# Patient Record
Sex: MALE | Race: WHITE | NOT HISPANIC OR LATINO | Employment: FULL TIME | URBAN - NONMETROPOLITAN AREA
[De-identification: names, ages, dates, MRNs, and addresses within clinical notes are randomized per-mention and may not be internally consistent; named-entity substitution may affect disease eponyms.]

---

## 2021-03-25 ENCOUNTER — IMMUNIZATION (OUTPATIENT)
Dept: VACCINE CLINIC | Facility: HOSPITAL | Age: 26
End: 2021-03-25

## 2021-03-25 PROCEDURE — 0001A: CPT | Performed by: INTERNAL MEDICINE

## 2021-03-25 PROCEDURE — 91300 HC SARSCOV02 VAC 30MCG/0.3ML IM: CPT | Performed by: INTERNAL MEDICINE

## 2021-04-15 ENCOUNTER — IMMUNIZATION (OUTPATIENT)
Dept: VACCINE CLINIC | Facility: HOSPITAL | Age: 26
End: 2021-04-15

## 2021-04-15 PROCEDURE — 0002A: CPT | Performed by: INTERNAL MEDICINE

## 2021-04-15 PROCEDURE — 91300 HC SARSCOV02 VAC 30MCG/0.3ML IM: CPT | Performed by: INTERNAL MEDICINE

## 2022-02-08 ENCOUNTER — OFFICE VISIT (OUTPATIENT)
Dept: UROLOGY | Facility: CLINIC | Age: 27
End: 2022-02-08

## 2022-02-08 VITALS — OXYGEN SATURATION: 97 % | WEIGHT: 243 LBS | BODY MASS INDEX: 34.79 KG/M2 | HEART RATE: 68 BPM | HEIGHT: 70 IN

## 2022-02-08 DIAGNOSIS — Z30.09 BIRTH CONTROL COUNSELING: Primary | ICD-10-CM

## 2022-02-08 PROCEDURE — 99204 OFFICE O/P NEW MOD 45 MIN: CPT | Performed by: UROLOGY

## 2022-02-08 RX ORDER — TRIAMCINOLONE ACETONIDE 0.25 MG/G
OINTMENT TOPICAL
COMMUNITY
Start: 2022-01-20

## 2022-02-08 RX ORDER — FLUOCINONIDE TOPICAL SOLUTION USP, 0.05% 0.5 MG/ML
0.05 SOLUTION TOPICAL NIGHTLY
COMMUNITY
Start: 2022-01-19

## 2022-02-08 RX ORDER — TACROLIMUS 1 MG/G
0.1 OINTMENT TOPICAL AS NEEDED
COMMUNITY
Start: 2022-01-19

## 2022-02-08 NOTE — PROGRESS NOTES
Office Visit New Urology      Patient Name: Barak Cuellar  : 1995   MRN: [id number]     Chief Complaint:    Chief Complaint   Patient presents with   • Sterilization       Referring Provider: Referring, Self    History of Present Illness: Barak Cuellar is a 26 y.o. male who presents to Urology today for ***      Subjective      Review of System: Review of Systems   Constitutional: Negative for chills, fatigue, fever and unexpected weight change.   HENT: Negative for sore throat.    Eyes: Negative for visual disturbance.   Respiratory: Negative for cough, chest tightness and shortness of breath.    Cardiovascular: Negative for chest pain and leg swelling.   Gastrointestinal: Negative for blood in stool, constipation, diarrhea, nausea, rectal pain and vomiting.   Genitourinary: Negative for decreased urine volume, difficulty urinating, dysuria, enuresis, flank pain, frequency, genital sores, hematuria and urgency.   Musculoskeletal: Negative for back pain and joint swelling.   Skin: Negative for rash and wound.   Neurological: Negative for seizures, speech difficulty, weakness and headaches.   Psychiatric/Behavioral: Negative for confusion, sleep disturbance and suicidal ideas. The patient is not nervous/anxious.       I have reviewed the ROS documented by my clinical staff, updated appropriately and I agree. Radha Chew MA    Past Medical History: History reviewed. No pertinent past medical history.    Past Surgical History:   Past Surgical History:   Procedure Laterality Date   • WISDOM TOOTH EXTRACTION         Family History:   Family History   Problem Relation Age of Onset   • No Known Problems Father    • No Known Problems Mother    • Prostate cancer Paternal Grandfather        Social History:   Social History     Socioeconomic History   • Marital status:    Tobacco Use   • Smoking status: Never Smoker   • Smokeless tobacco: Former User     Types: Chew   Vaping Use   • Vaping  Use: Never used   Substance and Sexual Activity   • Alcohol use: Yes   • Drug use: Never   • Sexual activity: Yes       Medications:     Current Outpatient Medications:   •  fluocinonide (LIDEX) 0.05 % external solution, Apply 0.05 application topically to the appropriate area as directed Every Night., Disp: , Rfl:   •  tacrolimus (PROTOPIC) 0.1 % ointment, Apply 0.1 application topically to the appropriate area as directed As Needed., Disp: , Rfl:   •  triamcinolone (KENALOG) 0.025 % ointment, APPLY OINTMENT TOPICALLY TWICE DAILY TO GROIN FOR 2 WEEKS, THEN ONCE DAILY FOR 2 WEEKS, THEN TWICE WEEKLY TO MAINTAIN, Disp: , Rfl:     Allergies:   No Known Allergies    IPSS Questionnaire (AUA-7):  Over the past month…    1)  Incomplete Emptying  How often have you had a sensation of not emptying your bladder?  {Ratin}   2)  Frequency  How often have you had to urinate less than every two hours? {Ratin}   3)  Intermittency  How often have you found you stopped and started again several times when you urinated?  {Ratin}   4) Urgency  How often have you found it difficult to postpone urination?  {Ratin}   5) Weak Stream  How often have you had a weak urinary stream?  {Ratin}   6) Straining  How often have you had to push or strain to begin urination?  {Ratin}   7) Nocturia  How many times did you typically get up at night to urinate?  {Ratin}   Total Score:  {0-35:21522}       Quality of life due to urinary symptoms:  If you were to spend the rest of your life with your urinary condition the way it is now, how would you feel about that? {Ratin}   Urine Leakage (Incontinence) {Ratin}     Sexual Health Inventory for Men (DEMETRI)   Over the past 6 months:     1. How do you rate your confidence that you could get and keep an erection?  {DEMETRI Score 1:06224}   2. When you had erections with sexual                                     stimulation, how often were your  "erections hard enough for penetration (entering your partner)?  {DEMETRI Scores 2:30643}   3. During sexual intercourse, how often were you able to maintain your erection after you had penetrated (entered) your partner?  {DEMETRI Scores 3:76810}   4. During sexual intercourse, how difficult was it to maintain your erection to completion of intercourse?  {DEMETRI Scores 4:53253}   5. When you attempted sexual intercourse, how often was it satisfactory for you?  {DEMETRI Scores 5:58398}    Total Score: {DEMETRI Total:08808}   The Sexual Health Inventory for Men further classifies ED severity with the following breakpoints:   1-7 (Severe ED) 8-11 (Moderate ED) 12-16 (Mild to Moderate ED) 17-21 (Mild ED)      Post void residual bladder scan:   ***       Objective     Physical Exam:   Vital Signs:   Vitals:    02/08/22 0811   Pulse: 68   SpO2: 97%   Weight: 110 kg (243 lb)   Height: 177.8 cm (70\")   PainSc: 0-No pain     Body mass index is 34.87 kg/m².     Physical Exam    Genitourinary  Penis: {Desc; circumcised/uncircumcised:5705::\"circumcised\"} penis, glans normal, no penile discharge.  No rashes/lesions.    Testes: descended bilaterally, no masses, {Desc; tender/non:61388} to palpation. Remainder of scrotal contents normal. No hernia appreciated.  Rectal:  Normal tone, no masses.  Prostate:  {NUMBERS; 5-50 BY 5:14916} grams.  Symmetric, non-tender, anodular and no induration.      Labs:   Brief Urine Lab Results     None               No results found for: GLUCOSE, CALCIUM, NA, K, CO2, CL, BUN, CREATININE, EGFRIFAFRI, EGFRIFNONA, BCR, ANIONGAP    No results found for: WBC, HGB, HCT, MCV, PLT    Images:   No Images in the past 120 days found..    Measures:   Tobacco:   Barak Cuellar  reports that he has never smoked. He has quit using smokeless tobacco.  His smokeless tobacco use included chew.. I have educated him on the risk of diseases from using tobacco products such as {Tobacco Cessation " "Diseases:72943::\"cancer\",\"COPD\",\"heart disease\"}.     I advised him to quit and he is {Willing/Not Willing to Quit Tobacco Products:10527}.    I spent {Time Spent Tobacco :43845} minutes counseling the patient.           Urine Incontinence: ( NOUI)  ***   Assessment / Plan      Assessment/Plan:   26 y.o. male is here today for ***    There are no diagnoses linked to this encounter.       Follow Up:   Return in about 6 weeks (around 3/24/2022).    I spent approximately *** minutes providing clinical care for this patient; including review of patient's chart and provider documentation, face to face time spent with patient in examination room (obtaining history, performing physical exam, discussing diagnosis and management options), placing orders, and completing patient documentation.     Herberth Carmona MD  [facility] [place]   "

## 2022-02-08 NOTE — PROGRESS NOTES
Office Note Vasectomy Consult     Patient Name: Barak Cuellar  : 1995   MRN: [id number]     Chief Complaint:  Elective Sterilization.   Chief Complaint   Patient presents with   • Sterilization       Referring Provider: Referring, Self    History of Present Illness: Barak Cuellar is a 26 y.o. male who presents to Urology today with the desire for irreversible, elective sterilization. He has 2 biological children.  His and his wife have discussed this decision and she accompanies him at appointment today. He denies lower urinary tract symptoms. Denies erectile dysfunction. Denies prior urologic evaluation including instrumentation or procedure.      Subjective      Review of Systems: Review of Systems   Constitutional: Negative for chills, fatigue, fever and unexpected weight change.   HENT: Negative for sore throat.    Eyes: Negative for visual disturbance.   Respiratory: Negative for cough, chest tightness and shortness of breath.    Cardiovascular: Negative for chest pain and leg swelling.   Gastrointestinal: Negative for blood in stool, constipation, diarrhea, nausea, rectal pain and vomiting.   Genitourinary: Negative for decreased urine volume, difficulty urinating, dysuria, enuresis, flank pain, frequency, genital sores, hematuria and urgency.   Musculoskeletal: Negative for back pain and joint swelling.   Skin: Negative for rash and wound.   Neurological: Negative for seizures, speech difficulty, weakness and headaches.   Psychiatric/Behavioral: Negative for confusion, sleep disturbance and suicidal ideas. The patient is not nervous/anxious.       I have reviewed the ROS documented by my clinical staff, I have updated appropriately and I agree. Herberth Carmona MD    Past Medical History: History reviewed. No pertinent past medical history.    Past Surgical History:   Past Surgical History:   Procedure Laterality Date   • WISDOM TOOTH EXTRACTION         Family History:   Family History  "  Problem Relation Age of Onset   • No Known Problems Father    • No Known Problems Mother    • Prostate cancer Paternal Grandfather        Social History:   Social History     Socioeconomic History   • Marital status:    Tobacco Use   • Smoking status: Never Smoker   • Smokeless tobacco: Former User     Types: Chew   Vaping Use   • Vaping Use: Never used   Substance and Sexual Activity   • Alcohol use: Yes   • Drug use: Never   • Sexual activity: Yes       Medications:     Current Outpatient Medications:   •  fluocinonide (LIDEX) 0.05 % external solution, Apply 0.05 application topically to the appropriate area as directed Every Night., Disp: , Rfl:   •  tacrolimus (PROTOPIC) 0.1 % ointment, Apply 0.1 application topically to the appropriate area as directed As Needed., Disp: , Rfl:   •  triamcinolone (KENALOG) 0.025 % ointment, APPLY OINTMENT TOPICALLY TWICE DAILY TO GROIN FOR 2 WEEKS, THEN ONCE DAILY FOR 2 WEEKS, THEN TWICE WEEKLY TO MAINTAIN, Disp: , Rfl:     Allergies:   No Known Allergies      Objective     Physical Exam:   Vital Signs:   Vitals:    02/08/22 0811   Pulse: 68   SpO2: 97%   Weight: 110 kg (243 lb)   Height: 177.8 cm (70\")   PainSc: 0-No pain     Body mass index is 34.87 kg/m².     Physical Exam  Vitals and nursing note reviewed.   Constitutional:       Appearance: Normal appearance. He is normal weight.   Cardiovascular:      Comments: Well-perfused  Pulmonary:      Effort: Pulmonary effort is normal.   Abdominal:      General: Abdomen is flat.      Palpations: Abdomen is soft.   Musculoskeletal:         General: Normal range of motion.   Skin:     General: Skin is warm and dry.   Neurological:      General: No focal deficit present.      Mental Status: He is alert and oriented to person, place, and time. Mental status is at baseline.   Psychiatric:         Mood and Affect: Mood normal.         Behavior: Behavior normal.         Thought Content: Thought content normal.         Judgment: " Judgment normal.         Genitourinary  Penis: circumcised penis, glans normal, no penile discharge.  No rashes/lesions.    Testes: descended bilaterally, no masses, nontender to palpation, bilateral vas deferens palpable. Remainder of scrotal contents normal. No hernia appreciated.    Labs:   Brief Urine Lab Results     None               No results found for: GLUCOSE, CALCIUM, NA, K, CO2, CL, BUN, CREATININE, EGFRIFAFRI, EGFRIFNONA, BCR, ANIONGAP    No results found for: WBC, HGB, HCT, MCV, PLT    Images:   No Images in the past 120 days found..    Measures:   Tobacco:   Barak Cuellar  reports that he has never smoked. He has quit using smokeless tobacco.  His smokeless tobacco use included chew.. I have educated him on the risk of diseases from using tobacco products.    Assessment / Plan      Assessment/Plan:   Mr. Cuellar is a 26 y.o. male who presents today requesting permanent, irreversible surgical sterilization. The patient is  with 2 biological children. He and his wife desire elective sterilization. Denies lower urinary tract symptoms. Denies erectile dysfunction.    Diagnoses and all orders for this visit:    1. Birth control counseling (Primary)         Patient Education:   Today we discussed the risks and benefits of irreversible and permanent surgical sterilization.  The vasectomy was discussed in detail with the patient today including the risks which are failure of the procedure, infection, bleeding, development of chronic testicular pain and the possibility of injuring adjacent structures which could potentially result in loss of the testicle.  Furthermore, the patient was told he would remain fertile following the procedure until he provided a semen analysis after 12 weeks and 20 ejaculations post procedure that showed no sperm. Discussed the indication for birth control until semen analysis is performed. The patient expressed understanding and desire to proceed.  He will be scheduled  for vasectomy at his convenience.       Follow Up:   Plan for vasectomy to be performed in [facility] urology office. We will schedule in next 3 to 4 weeks pending patient schedule availability.    I spent approximately 45 minutes providing clinical care for this patient; including review of patient's chart and provider documentation, face to face time spent with patient in examination room (obtaining history, performing physical exam, discussing diagnosis and management options), placing orders, and completing patient documentation.     Herberth Carmona MD  [facility] [place]

## 2022-03-24 ENCOUNTER — OFFICE VISIT (OUTPATIENT)
Dept: UROLOGY | Facility: CLINIC | Age: 27
End: 2022-03-24

## 2022-03-24 VITALS — BODY MASS INDEX: 34.79 KG/M2 | HEIGHT: 70 IN | WEIGHT: 243 LBS

## 2022-03-24 DIAGNOSIS — Z98.52 S/P VASECTOMY: Primary | ICD-10-CM

## 2022-03-24 PROCEDURE — 55250 REMOVAL OF SPERM DUCT(S): CPT | Performed by: UROLOGY

## 2022-03-24 NOTE — PROGRESS NOTES
Preprocedure diagnosis  Unwanted Fertility     Postprocedure diagnosis  Unwanted Fertility     Procedure  Bilateral Vasectomy     Attending surgeon  Herberth Carmona MD     Anesthesia  1% Lidocaine mixed with 0.5% Bupivicaine local anesthetic      Complications  None     Indications  27 y.o. male who desires desires elective sterility presents for vasectomy.  Informed consent was reviewed and signed.  Risks, benefits, alternatives to the procedure were discussed.      Findings  - Uncomplicated bilateral vasectomy  - 1 cm segments of the bilateral vas deferens sent to pathology     Procedure  The patient was identified and informed consent was reviewed and signed.  He was placed in the supine position.  His genitals were prepped and draped in sterile fashion.  The right vas deferens was isolated. Local anesthetic was injected at the skin and deep to the dartos tissue.    After anesthesia confirmed, an 11 blade scalpel was used to make a small skin incision in the right lateral hemiscrotum.   A ring clamp was used to isolate the vas deferens and pull this out through the skin. Bovie cautery to cauterize  perivasal vessels.  The right vas deferens was skeletonized over an area of 1-1/2 cm.  2 hemostat clamps were used to clamp the testicular and abdominal ends of the vas deferens.  Between the area of the hemostat a 1 cm segment of the vas was transected with Metzenbaum scissors.  Electrocautery was used to cauterize the ends of the vas deferens. The right sided segment was sent off to pathology.  Using a clip applier clips were placed on both the testicular and abdominal ends of the vas deferens.  Once hemostasis was confirmed the vas deferens and tissue was delivered back into the scrotum. A 4-0 chromic suture was used to sew a horizontal mattress stitch at the skin incision.  Hemostasis was confirmed.      And identical procedure was performed on the left side.  The left-sided vas deferens segment was sent off to  pathology.  The skin was closed with a horizontal mattress 4-0 chromic suture again.  Neosporin was applied over the incisions and scrotal fluff gauze were placed.  The patient tolerated the procedure well.     Follow Up: Patient will complete 20 ejaculations and 12 weeks before he provides us a semen analysis.  He was sent home with bacitracin to apply over his incision for 7 days.  Return precautions discussed at length.  The patient is understanding that any unprotected intercourse can result in pregnancy until he provides a semen analysis at above interval.  He is understanding that he requires birth control method until his semen analysis is performed and completed.  He expresses understanding and agreement with this.  Will be contacted once his semen analysis performed.

## 2022-06-27 ENCOUNTER — LAB (OUTPATIENT)
Dept: LAB | Facility: HOSPITAL | Age: 27
End: 2022-06-27

## 2022-06-27 DIAGNOSIS — Z98.52 S/P VASECTOMY: ICD-10-CM

## 2022-06-27 LAB — SPERM - POST VASECTOMY: NORMAL

## 2022-06-27 PROCEDURE — 89321 SEMEN ANAL SPERM DETECTION: CPT

## 2022-06-28 ENCOUNTER — TELEPHONE (OUTPATIENT)
Dept: UROLOGY | Facility: CLINIC | Age: 27
End: 2022-06-28

## 2022-06-28 NOTE — TELEPHONE ENCOUNTER
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 No sperm was observed in sample pellet obtained by centrifugation.           Patient provided semen analysis.  Analysis has been reviewed and no sperm identified.  Contacted patient on 6/28/2022 via phone.  He is understanding of specimen results.  Follow-up.

## 2024-03-04 ENCOUNTER — OFFICE VISIT (OUTPATIENT)
Age: 29
End: 2024-03-04
Payer: COMMERCIAL

## 2024-03-04 VITALS
DIASTOLIC BLOOD PRESSURE: 76 MMHG | HEART RATE: 78 BPM | BODY MASS INDEX: 33.93 KG/M2 | OXYGEN SATURATION: 99 % | WEIGHT: 237 LBS | TEMPERATURE: 98 F | HEIGHT: 70 IN | SYSTOLIC BLOOD PRESSURE: 126 MMHG

## 2024-03-04 DIAGNOSIS — R07.9 LEFT-SIDED CHEST PAIN: ICD-10-CM

## 2024-03-04 DIAGNOSIS — M25.512 ACUTE PAIN OF LEFT SHOULDER: Primary | ICD-10-CM

## 2024-03-04 PROBLEM — L40.9 PSORIASIS: Status: ACTIVE | Noted: 2023-11-22

## 2024-03-04 NOTE — PROGRESS NOTES
"Chief Complaint  Shoulder Pain (L side going into chest, 1 month ) and Insect Bite (Back of l leg noticed Friday )    Subjective        Barak Cuellar presents to [facility]  History of Present Illness  Pt is here today to establish care. He has concerns of left shoulder pain that started about a month ago. He states he lifts weights and has contributed the pain to lifting. He states he was lying in bed a few nights ago and pain was worsening. Pain is going into the left side of the chest. He describes it as a \"nagging\" pain. He states it is intermittent. No personal history of HTN or cardiac concerns. MGF with congestive heart failure. MGM with cardiac concern, unsure of diagnosis; she was a smoker. ECG today with NSR. Pain is likely muscular. He states it starts in the shoulder and radiates to the chest. We discussed ibuprofen, rest, and ice or heat. He will stop lifting weights for about a week and see if pain improves.     Objective   Vital Signs:  /76   Pulse 78   Temp 98 °F (36.7 °C)   Ht 177.8 cm (70\")   Wt 108 kg (237 lb)   SpO2 99%   BMI 34.01 kg/m²   Estimated body mass index is 34.01 kg/m² as calculated from the following:    Height as of this encounter: 177.8 cm (70\").    Weight as of this encounter: 108 kg (237 lb).           Physical Exam  Vitals reviewed.   Constitutional:       Appearance: Normal appearance.   HENT:      Nose: Nose normal.      Mouth/Throat:      Mouth: Mucous membranes are moist.      Pharynx: Oropharynx is clear.   Eyes:      Conjunctiva/sclera: Conjunctivae normal.   Cardiovascular:      Rate and Rhythm: Normal rate and regular rhythm.      Heart sounds: Normal heart sounds.   Pulmonary:      Effort: Pulmonary effort is normal.      Breath sounds: Normal breath sounds.   Musculoskeletal:         General: No swelling. Normal range of motion.      Cervical back: Normal range of motion.   Skin:     General: Skin is warm.   Neurological: "      Mental Status: He is alert and oriented to person, place, and time.   Psychiatric:         Mood and Affect: Mood normal.         Behavior: Behavior normal.         Thought Content: Thought content normal.        Result Review :              ECG 12 Lead    Date/Time: 3/5/2024 9:26 AM  Performed by: Natalie Lacey APRN    Authorized by: Natalie Lacey APRN  Comparison: not compared with previous ECG   Rhythm: sinus rhythm  Rate: normal  QRS axis: normal    Clinical impression: normal ECG           Assessment and Plan     Diagnoses and all orders for this visit:    1. Acute pain of left shoulder (Primary)  -     ECG 12 Lead    2. Left-sided chest pain  -     ECG 12 Lead    Other orders  -     ECG Scan     - Rest, Ice, and Ibuprofen for symptom  management. Return if symptoms do not improve.          Follow Up     Return in about 1 month (around 4/4/2024) for Annual.  Patient was given instructions and counseling regarding his condition or for health maintenance advice. Please see specific information pulled into the AVS if appropriate.

## 2024-03-08 ENCOUNTER — PATIENT ROUNDING (BHMG ONLY) (OUTPATIENT)
Age: 29
End: 2024-03-08
Payer: COMMERCIAL

## 2024-03-08 NOTE — PROGRESS NOTES
My name is Jes and I am the manager of the [facility] [place] [facility].  I wanted to take a minute and say hello and welcome you to the practice.      I hope you had a good experience with the office.  If you have any questions or concerns please let me know.    Thank you for choosing [facility] and we look forward to taking care of your health needs.     Jes Loera  Practice Manager

## 2024-04-10 ENCOUNTER — OFFICE VISIT (OUTPATIENT)
Age: 29
End: 2024-04-10
Payer: COMMERCIAL

## 2024-04-10 ENCOUNTER — LAB (OUTPATIENT)
Age: 29
End: 2024-04-10
Payer: COMMERCIAL

## 2024-04-10 VITALS
DIASTOLIC BLOOD PRESSURE: 90 MMHG | BODY MASS INDEX: 33.21 KG/M2 | HEART RATE: 55 BPM | SYSTOLIC BLOOD PRESSURE: 142 MMHG | OXYGEN SATURATION: 98 % | HEIGHT: 70 IN | WEIGHT: 232 LBS

## 2024-04-10 DIAGNOSIS — Z00.00 ANNUAL PHYSICAL EXAM: ICD-10-CM

## 2024-04-10 DIAGNOSIS — Z00.00 ANNUAL PHYSICAL EXAM: Primary | ICD-10-CM

## 2024-04-10 DIAGNOSIS — Z00.00 ROUTINE GENERAL MEDICAL EXAMINATION AT A HEALTH CARE FACILITY: Primary | ICD-10-CM

## 2024-04-10 LAB
ALBUMIN SERPL-MCNC: 4.8 G/DL (ref 3.5–5.2)
ALBUMIN/GLOB SERPL: 2.2 G/DL
ALP SERPL-CCNC: 76 U/L (ref 39–117)
ALT SERPL W P-5'-P-CCNC: 21 U/L (ref 1–41)
ANION GAP SERPL CALCULATED.3IONS-SCNC: 8 MMOL/L (ref 5–15)
AST SERPL-CCNC: 18 U/L (ref 1–40)
BILIRUB SERPL-MCNC: 1.6 MG/DL (ref 0–1.2)
BUN SERPL-MCNC: 15 MG/DL (ref 6–20)
BUN/CREAT SERPL: 10.1 (ref 7–25)
CALCIUM SPEC-SCNC: 9.4 MG/DL (ref 8.6–10.5)
CHLORIDE SERPL-SCNC: 104 MMOL/L (ref 98–107)
CHOLEST SERPL-MCNC: 144 MG/DL (ref 0–200)
CO2 SERPL-SCNC: 28 MMOL/L (ref 22–29)
CREAT SERPL-MCNC: 1.49 MG/DL (ref 0.76–1.27)
DEPRECATED RDW RBC AUTO: 39.2 FL (ref 37–54)
EGFRCR SERPLBLD CKD-EPI 2021: 63.9 ML/MIN/1.73
ERYTHROCYTE [DISTWIDTH] IN BLOOD BY AUTOMATED COUNT: 12 % (ref 12.3–15.4)
GLOBULIN UR ELPH-MCNC: 2.2 GM/DL
GLUCOSE SERPL-MCNC: 83 MG/DL (ref 65–99)
HCT VFR BLD AUTO: 44.6 % (ref 37.5–51)
HDLC SERPL-MCNC: 55 MG/DL (ref 40–60)
HGB BLD-MCNC: 15.1 G/DL (ref 13–17.7)
LDLC SERPL CALC-MCNC: 77 MG/DL (ref 0–100)
LDLC/HDLC SERPL: 1.41 {RATIO}
MCH RBC QN AUTO: 30.7 PG (ref 26.6–33)
MCHC RBC AUTO-ENTMCNC: 33.9 G/DL (ref 31.5–35.7)
MCV RBC AUTO: 90.7 FL (ref 79–97)
PLATELET # BLD AUTO: 210 10*3/MM3 (ref 140–450)
PMV BLD AUTO: 10.2 FL (ref 6–12)
POTASSIUM SERPL-SCNC: 4.3 MMOL/L (ref 3.5–5.2)
PROT SERPL-MCNC: 7 G/DL (ref 6–8.5)
RBC # BLD AUTO: 4.92 10*6/MM3 (ref 4.14–5.8)
SODIUM SERPL-SCNC: 140 MMOL/L (ref 136–145)
TRIGL SERPL-MCNC: 57 MG/DL (ref 0–150)
VLDLC SERPL-MCNC: 12 MG/DL (ref 5–40)
WBC NRBC COR # BLD AUTO: 4.7 10*3/MM3 (ref 3.4–10.8)

## 2024-04-10 PROCEDURE — 80061 LIPID PANEL: CPT | Performed by: NURSE PRACTITIONER

## 2024-04-10 PROCEDURE — 85027 COMPLETE CBC AUTOMATED: CPT | Performed by: NURSE PRACTITIONER

## 2024-04-10 PROCEDURE — 80053 COMPREHEN METABOLIC PANEL: CPT | Performed by: NURSE PRACTITIONER

## 2024-04-10 PROCEDURE — 36415 COLL VENOUS BLD VENIPUNCTURE: CPT | Performed by: NURSE PRACTITIONER

## 2024-04-10 PROCEDURE — 99395 PREV VISIT EST AGE 18-39: CPT | Performed by: NURSE PRACTITIONER

## 2024-04-10 NOTE — PROGRESS NOTES
"Chief Complaint  Annual Exam and Labs Only (Currently fasting)    Subjective          Barak Cuellar presents to [facility] for   History of Present Illness  Pt is here today for annual physical. He was previously seen for left chest pain, but that seems to improve with massage therapy. He does not have chest pain with cardio exercises, such as running. He lifts weights regularly. History of MGF with CHF in his 80's. PGF had by-pass surgery in his 60's. Otherwise, he has no concerns. He is fasting today, will order routine labs.     Objective   Vital Signs:   Vitals:    04/10/24 0805   BP: 142/90   BP Location: Right arm   Patient Position: Sitting   Cuff Size: Adult   Pulse: 55   SpO2: 98%   Weight: 105 kg (232 lb)   Height: 177.8 cm (70\")   PainSc: 0-No pain     Body mass index is 33.29 kg/m².        Physical Exam  Vitals reviewed.   Constitutional:       Appearance: Normal appearance.   HENT:      Right Ear: Tympanic membrane, ear canal and external ear normal.      Left Ear: Tympanic membrane, ear canal and external ear normal.      Nose: Nose normal.      Mouth/Throat:      Mouth: Mucous membranes are moist.      Pharynx: Oropharynx is clear.   Eyes:      Conjunctiva/sclera: Conjunctivae normal.      Pupils: Pupils are equal, round, and reactive to light.   Cardiovascular:      Rate and Rhythm: Normal rate and regular rhythm.      Heart sounds: Normal heart sounds.   Pulmonary:      Effort: Pulmonary effort is normal.      Breath sounds: Normal breath sounds.   Abdominal:      General: Bowel sounds are normal.      Palpations: Abdomen is soft.      Tenderness: There is no abdominal tenderness. There is no guarding or rebound.   Musculoskeletal:         General: Normal range of motion.      Cervical back: Normal range of motion and neck supple.   Skin:     General: Skin is warm.   Neurological:      Mental Status: He is alert and oriented to person, place, and time. "   Psychiatric:         Mood and Affect: Mood normal.         Behavior: Behavior normal.         Thought Content: Thought content normal.        Result Review :                Immunization History   Administered Date(s) Administered    COVID-19 (PFIZER) Purple Cap Monovalent 03/25/2021, 04/15/2021, 11/22/2021    DTaP, Unspecified 10/08/1997    Influenza, Unspecified 10/04/2021    MMR 10/08/1997    OPV 10/08/1997    Tdap 07/23/2014     Health Maintenance   Topic Date Due    HEPATITIS C SCREENING  Never done    COVID-19 Vaccine (4 - 2023-24 season) 06/30/2024 (Originally 9/1/2023)    TDAP/TD VACCINES (2 - Td or Tdap) 07/23/2024    INFLUENZA VACCINE  08/01/2024    ANNUAL PHYSICAL  04/10/2025    BMI FOLLOWUP  04/10/2025    Pneumococcal Vaccine 0-64  Aged Out        Assessment and Plan    Diagnoses and all orders for this visit:    1. Annual physical exam (Primary)  -     CBC (No Diff); Future  -     Comprehensive Metabolic Panel; Future  -     Lipid Panel; Future        Counseling/anticipatory guidance: Nutrition, physical activity, healthy weight, dental health, mental health, eye exam, immunizations, screenings    Discussed routine vaccinations; pt up to date. Advised pt on recommendations for routine labs. Discussed BMI.       Follow Up   Return in about 1 year (around 4/10/2025) for Annual.  Patient was given instructions and counseling regarding his condition or for health maintenance advice. Please see specific information pulled into the AVS if appropriate.

## 2025-03-17 ENCOUNTER — OFFICE VISIT (OUTPATIENT)
Age: 30
End: 2025-03-17
Payer: COMMERCIAL

## 2025-03-17 VITALS
HEART RATE: 74 BPM | SYSTOLIC BLOOD PRESSURE: 136 MMHG | DIASTOLIC BLOOD PRESSURE: 90 MMHG | WEIGHT: 244.4 LBS | OXYGEN SATURATION: 98 % | HEIGHT: 70 IN | BODY MASS INDEX: 34.99 KG/M2

## 2025-03-17 DIAGNOSIS — R10.31 RIGHT LOWER QUADRANT PAIN: Primary | ICD-10-CM

## 2025-03-17 LAB
BILIRUB BLD-MCNC: NEGATIVE MG/DL
CLARITY, POC: CLEAR
COLOR UR: YELLOW
EXPIRATION DATE: NORMAL
GLUCOSE UR STRIP-MCNC: NEGATIVE MG/DL
KETONES UR QL: NEGATIVE
LEUKOCYTE EST, POC: NEGATIVE
Lab: NORMAL
NITRITE UR-MCNC: NEGATIVE MG/ML
PH UR: 6 [PH] (ref 5–8)
PROT UR STRIP-MCNC: NEGATIVE MG/DL
RBC # UR STRIP: NEGATIVE /UL
SP GR UR: 1.01 (ref 1–1.03)
UROBILINOGEN UR QL: NORMAL

## 2025-03-17 PROCEDURE — 99212 OFFICE O/P EST SF 10 MIN: CPT | Performed by: NURSE PRACTITIONER

## 2025-03-17 PROCEDURE — 81003 URINALYSIS AUTO W/O SCOPE: CPT | Performed by: NURSE PRACTITIONER

## 2025-03-17 NOTE — PROGRESS NOTES
Chief Complaint  Abdominal Pain (RLQ)    Subjective        Barak Cuellar presents to [facility]  History of Present Illness    History of Present Illness  The patient presents for evaluation of right lower quadrant pain.    He began experiencing persistent, dull, lingering pain in the right lower quadrant of his abdomen last Thursday or Friday. 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 The pain was not severe but constant, and he noticed an associated uneasiness in his stomach last night. He did not experience any tenderness upon palpation. This morning, he suspected constipation, but throughout the day, he developed diarrhea. Despite these symptoms, he reports no fever or vomiting. He describes the pain as dull and lingering, not intense. He recalls an incident last night where the pain intensified when he bent over to perform a household task. He also notes that lying flat on his stomach, a position he typically finds comfortable, exacerbates the pain. He believes the pain may be positional and is located near his hip joint. He has not experienced any issues with bowel movements or passing gas. He has a history of irritable bowel syndrome (IBS), which he manages through dietary control, and typically does not experience constipation. This is his first experience of such pain in the area. He speculates that the diarrhea may be due to dietary indiscretion.    He is scheduled for a physical examination on 04/08/2025 and has expressed interest in undergoing metabolic screening and blood work, particularly to assess his testosterone levels.    Results  Laboratory Studies  Urine test showed no abnormalities.     Results for orders placed or performed in visit on 03/17/25   POC Urinalysis Dipstick, Automated    Collection Time: 03/17/25  2:52 PM    Specimen: Urine   Result Value Ref Range    Color Yellow  "Yellow, Straw, Dark Yellow, Radha    Clarity, UA Clear Clear    Specific Gravity  1.015 1.005 - 1.030    pH, Urine 6.0 5.0 - 8.0    Leukocytes Negative Negative    Nitrite, UA Negative Negative    Protein, POC Negative Negative mg/dL    Glucose, UA Negative Negative mg/dL    Ketones, UA Negative Negative    Urobilinogen, UA Normal Normal, 0.2 E.U./dL    Bilirubin Negative Negative    Blood, UA Negative Negative    Lot Number [id number]     Expiration Date 2025-06-29        Objective   Vital Signs:  /90 (BP Location: Right arm, Patient Position: Sitting, Cuff Size: Adult)   Pulse 74   Ht 177.8 cm (70\")   Wt 111 kg (244 lb 6.4 oz)   SpO2 98%   BMI 35.07 kg/m²   Estimated body mass index is 35.07 kg/m² as calculated from the following:    Height as of this encounter: 177.8 cm (70\").    Weight as of this encounter: 111 kg (244 lb 6.4 oz).    Physical Exam  Vitals reviewed.   Constitutional:       Appearance: Normal appearance.   HENT:      Nose: Nose normal.      Mouth/Throat:      Mouth: Mucous membranes are moist.      Pharynx: Oropharynx is clear.   Eyes:      Conjunctiva/sclera: Conjunctivae normal.   Cardiovascular:      Rate and Rhythm: Normal rate and regular rhythm.      Heart sounds: Normal heart sounds.   Pulmonary:      Effort: Pulmonary effort is normal.      Breath sounds: Normal breath sounds.   Abdominal:      General: Bowel sounds are normal.      Palpations: Abdomen is soft.      Tenderness: There is no abdominal tenderness. There is no guarding or rebound.   Musculoskeletal:         General: Normal range of motion.      Cervical back: Normal range of motion.   Skin:     General: Skin is warm.   Neurological:      Mental Status: He is alert and oriented to person, place, and time.   Psychiatric:         Mood and Affect: Mood normal.         Behavior: Behavior normal.         Thought Content: Thought content normal.        Result Review :                  Assessment and Plan   Diagnoses " and all orders for this visit:    1. Right lower quadrant pain (Primary)  -     POC Urinalysis Dipstick, Automated        Assessment & Plan  1. Pain in the right lower quadrant.  The etiology of the pain could potentially be muscular in nature. Urinalysis results were within normal limits. The absence of fever, vomiting, or rebound tenderness upon palpation suggests that appendicitis is unlikely. He has been advised to monitor his symptoms closely and report any changes. If he experiences worsening pain, fever, vomiting, or rebound tenderness, he should seek immediate medical attention at the emergency room.           The following portions of the patient's history were reviewed and updated as appropriate: allergies, current medications, past family history, past medical history, past social history, past surgical history, and problem list.       Follow Up   No follow-ups on file.  Patient was given instructions and counseling regarding his condition or for health maintenance advice. Please see specific information pulled into the AVS if appropriate.     Patient or patient representative verbalized consent for the use of Ambient Listening during the visit with  JAKE Chambers for chart documentation. 3/17/2025  15:16 EDT

## 2025-04-11 ENCOUNTER — OFFICE VISIT (OUTPATIENT)
Age: 30
End: 2025-04-11
Payer: COMMERCIAL

## 2025-04-11 ENCOUNTER — LAB (OUTPATIENT)
Age: 30
End: 2025-04-11
Payer: COMMERCIAL

## 2025-04-11 VITALS
HEART RATE: 63 BPM | DIASTOLIC BLOOD PRESSURE: 84 MMHG | HEIGHT: 70 IN | WEIGHT: 244 LBS | OXYGEN SATURATION: 97 % | BODY MASS INDEX: 34.93 KG/M2 | SYSTOLIC BLOOD PRESSURE: 126 MMHG

## 2025-04-11 DIAGNOSIS — R53.83 FATIGUE, UNSPECIFIED TYPE: ICD-10-CM

## 2025-04-11 DIAGNOSIS — E66.812 CLASS 2 OBESITY WITHOUT SERIOUS COMORBIDITY WITH BODY MASS INDEX (BMI) OF 35.0 TO 35.9 IN ADULT, UNSPECIFIED OBESITY TYPE: ICD-10-CM

## 2025-04-11 DIAGNOSIS — Z00.00 ANNUAL PHYSICAL EXAM: Primary | ICD-10-CM

## 2025-04-11 DIAGNOSIS — Z00.00 ANNUAL PHYSICAL EXAM: ICD-10-CM

## 2025-04-11 DIAGNOSIS — Z11.59 ENCOUNTER FOR HEPATITIS C SCREENING TEST FOR LOW RISK PATIENT: ICD-10-CM

## 2025-04-11 LAB
ALBUMIN SERPL-MCNC: 4.5 G/DL (ref 3.5–5.2)
ALBUMIN/GLOB SERPL: 1.6 G/DL
ALP SERPL-CCNC: 91 U/L (ref 39–117)
ALT SERPL W P-5'-P-CCNC: 16 U/L (ref 1–41)
ANION GAP SERPL CALCULATED.3IONS-SCNC: 9.5 MMOL/L (ref 5–15)
AST SERPL-CCNC: 21 U/L (ref 1–40)
BILIRUB SERPL-MCNC: 1.7 MG/DL (ref 0–1.2)
BUN SERPL-MCNC: 17 MG/DL (ref 6–20)
BUN/CREAT SERPL: 14.9 (ref 7–25)
CALCIUM SPEC-SCNC: 9.7 MG/DL (ref 8.6–10.5)
CHLORIDE SERPL-SCNC: 102 MMOL/L (ref 98–107)
CHOLEST SERPL-MCNC: 161 MG/DL (ref 0–200)
CO2 SERPL-SCNC: 27.5 MMOL/L (ref 22–29)
CREAT SERPL-MCNC: 1.14 MG/DL (ref 0.76–1.27)
DEPRECATED RDW RBC AUTO: 38.9 FL (ref 37–54)
EGFRCR SERPLBLD CKD-EPI 2021: 87.6 ML/MIN/1.73
ERYTHROCYTE [DISTWIDTH] IN BLOOD BY AUTOMATED COUNT: 11.9 % (ref 12.3–15.4)
GLOBULIN UR ELPH-MCNC: 2.9 GM/DL
GLUCOSE SERPL-MCNC: 81 MG/DL (ref 65–99)
HBA1C MFR BLD: 5.1 % (ref 4.8–5.6)
HCT VFR BLD AUTO: 46.9 % (ref 37.5–51)
HDLC SERPL-MCNC: 50 MG/DL (ref 40–60)
HGB BLD-MCNC: 15.9 G/DL (ref 13–17.7)
LDLC SERPL CALC-MCNC: 100 MG/DL (ref 0–100)
LDLC/HDLC SERPL: 2 {RATIO}
MCH RBC QN AUTO: 30.5 PG (ref 26.6–33)
MCHC RBC AUTO-ENTMCNC: 33.9 G/DL (ref 31.5–35.7)
MCV RBC AUTO: 90 FL (ref 79–97)
PLATELET # BLD AUTO: 209 10*3/MM3 (ref 140–450)
PMV BLD AUTO: 10.4 FL (ref 6–12)
POTASSIUM SERPL-SCNC: 4.3 MMOL/L (ref 3.5–5.2)
PROT SERPL-MCNC: 7.4 G/DL (ref 6–8.5)
RBC # BLD AUTO: 5.21 10*6/MM3 (ref 4.14–5.8)
SODIUM SERPL-SCNC: 139 MMOL/L (ref 136–145)
TESTOST SERPL-MCNC: 417 NG/DL (ref 249–836)
TRIGL SERPL-MCNC: 55 MG/DL (ref 0–150)
TSH SERPL DL<=0.05 MIU/L-ACNC: 7.32 UIU/ML (ref 0.27–4.2)
VLDLC SERPL-MCNC: 11 MG/DL (ref 5–40)
WBC NRBC COR # BLD AUTO: 3.72 10*3/MM3 (ref 3.4–10.8)

## 2025-04-11 PROCEDURE — 80050 GENERAL HEALTH PANEL: CPT | Performed by: NURSE PRACTITIONER

## 2025-04-11 PROCEDURE — 86803 HEPATITIS C AB TEST: CPT | Performed by: NURSE PRACTITIONER

## 2025-04-11 PROCEDURE — 99395 PREV VISIT EST AGE 18-39: CPT | Performed by: NURSE PRACTITIONER

## 2025-04-11 PROCEDURE — 84439 ASSAY OF FREE THYROXINE: CPT | Performed by: NURSE PRACTITIONER

## 2025-04-11 PROCEDURE — 84403 ASSAY OF TOTAL TESTOSTERONE: CPT | Performed by: NURSE PRACTITIONER

## 2025-04-11 PROCEDURE — 83036 HEMOGLOBIN GLYCOSYLATED A1C: CPT | Performed by: NURSE PRACTITIONER

## 2025-04-11 PROCEDURE — 80061 LIPID PANEL: CPT | Performed by: NURSE PRACTITIONER

## 2025-04-11 NOTE — PROGRESS NOTES
"Chief Complaint  Annual Exam    Subjective          Barak Cuellar presents to [facility] for   History of Present Illness  History of Present Illness  The patient presents for a routine checkup.    He reports no current health issues, with the exception of a previous episode of suspected appendicitis 2 weeks prior, which has since resolved. He is not experiencing any gastrointestinal symptoms such as constipation, diarrhea, nausea, vomiting, or abdominal pain. He also reports no presence of hernias or testicular discomfort. He expresses interest in undergoing a testosterone level check due to curiosity, not because of any specific symptoms. He acknowledges experiencing general fatigue, which he attributes to his responsibilities as a parent of 2 children, but does not consider it debilitating. His dental and vision screenings are up-to-date.    IMMUNIZATIONS  He is up to date on his immunizations.    Objective   Vital Signs:   Vitals:    04/11/25 0801   BP: 126/84   BP Location: Right arm   Patient Position: Sitting   Cuff Size: Adult   Pulse: 63   SpO2: 97%   Weight: 111 kg (244 lb)   Height: 177.8 cm (70\")   PainSc: 0-No pain     Body mass index is 35.01 kg/m².        The following portions of the patient's history were reviewed and updated as appropriate: allergies, current medications, past family history, past medical history, past social history, past surgical history, and problem list.      Physical Exam  Vitals and nursing note reviewed.   Constitutional:       Appearance: Normal appearance.   HENT:      Right Ear: Tympanic membrane, ear canal and external ear normal.      Left Ear: Tympanic membrane, ear canal and external ear normal.      Nose: Nose normal.      Mouth/Throat:      Mouth: Mucous membranes are moist.      Pharynx: Oropharynx is clear.   Eyes:      Conjunctiva/sclera: Conjunctivae normal.      Pupils: Pupils are equal, round, and reactive to light. "   Cardiovascular:      Rate and Rhythm: Normal rate and regular rhythm.      Heart sounds: Normal heart sounds.   Pulmonary:      Effort: Pulmonary effort is normal.      Breath sounds: Normal breath sounds.   Abdominal:      General: Bowel sounds are normal.      Palpations: Abdomen is soft.   Musculoskeletal:         General: Normal range of motion.      Cervical back: Normal range of motion and neck supple.   Skin:     General: Skin is warm.   Neurological:      Mental Status: He is alert and oriented to person, place, and time.   Psychiatric:         Mood and Affect: Mood normal.         Behavior: Behavior normal.         Thought Content: Thought content normal.        Result Review :                Immunization History   Administered Date(s) Administered    COVID-19 (PFIZER) Purple Cap Monovalent 03/25/2021, 04/15/2021, 11/22/2021    DTaP, Unspecified 10/08/1997    Influenza, Unspecified 10/04/2021    MMR 10/08/1997    OPV 10/08/1997    Tdap 07/23/2014, 04/11/2020     Health Maintenance   Topic Date Due    HEPATITIS C SCREENING  Never done    COVID-19 Vaccine (4 - 2024-25 season) 10/11/2025 (Originally 9/1/2024)    INFLUENZA VACCINE  07/01/2025    ANNUAL PHYSICAL  04/11/2026    TDAP/TD VACCINES (3 - Td or Tdap) 04/11/2030    Pneumococcal Vaccine 0-49  Aged Out        Assessment and Plan    Diagnoses and all orders for this visit:    1. Annual physical exam (Primary)  -     CBC (No Diff); Future  -     Comprehensive Metabolic Panel; Future  -     Lipid Panel; Future    2. Class 2 obesity without serious comorbidity with body mass index (BMI) of 35.0 to 35.9 in adult, unspecified obesity type  -     Hemoglobin A1c; Future  -     TSH Rfx On Abnormal To Free T4; Future    3. Encounter for hepatitis C screening test for low risk patient  -     Hepatitis C Antibody; Future    4. Fatigue, unspecified type  -     Testosterone; Future        Assessment & Plan  1. Health maintenance.  His BMI is elevated, placing him in  the obesity range. However, he is active in weight lifting so this may not be an accurate representation for him. He is up to date on his immunizations and dental and vision screenings. A comprehensive set of laboratory tests will be conducted today, including CBC, CMP, cholesterol panel, hepatitis C screening, diabetes screening, thyroid function tests, and testosterone level assessment. The results of these tests will be communicated via [facility].    Counseling/anticipatory guidance: Nutrition, physical activity, healthy weight, dental health, mental health, eye exam, immunizations, screenings   - Reviewed immunization records; pt up to date. Discussed routine labs. Pt up to date on dental and vision screens.       Follow Up   Return in about 1 year (around 4/11/2026) for Annual.  Patient was given instructions and counseling regarding his condition or for health maintenance advice. Please see specific information pulled into the AVS if appropriate.     Patient or patient representative verbalized consent for the use of Ambient Listening during the visit with  JAKE Chambers for chart documentation. 4/11/2025  08:11 EDT

## 2025-04-12 LAB
HCV AB SER QL: NORMAL
T4 FREE SERPL-MCNC: 1.35 NG/DL (ref 0.92–1.68)

## 2025-06-06 ENCOUNTER — LAB (OUTPATIENT)
Age: 30
End: 2025-06-06
Payer: COMMERCIAL

## 2025-06-06 ENCOUNTER — OFFICE VISIT (OUTPATIENT)
Age: 30
End: 2025-06-06
Payer: COMMERCIAL

## 2025-06-06 VITALS
SYSTOLIC BLOOD PRESSURE: 124 MMHG | OXYGEN SATURATION: 98 % | HEIGHT: 70 IN | WEIGHT: 236.6 LBS | BODY MASS INDEX: 33.87 KG/M2 | HEART RATE: 60 BPM | DIASTOLIC BLOOD PRESSURE: 70 MMHG

## 2025-06-06 DIAGNOSIS — N50.811 PAIN IN RIGHT TESTICLE: ICD-10-CM

## 2025-06-06 DIAGNOSIS — R10.31 CHRONIC RLQ PAIN: Primary | ICD-10-CM

## 2025-06-06 DIAGNOSIS — G89.29 CHRONIC RLQ PAIN: Primary | ICD-10-CM

## 2025-06-06 DIAGNOSIS — E03.8 SUBCLINICAL HYPOTHYROIDISM: ICD-10-CM

## 2025-06-06 LAB
BILIRUB BLD-MCNC: NEGATIVE MG/DL
CLARITY, POC: CLEAR
COLOR UR: YELLOW
EXPIRATION DATE: NORMAL
GLUCOSE UR STRIP-MCNC: NEGATIVE MG/DL
KETONES UR QL: NEGATIVE
LEUKOCYTE EST, POC: NEGATIVE
Lab: NORMAL
NITRITE UR-MCNC: NEGATIVE MG/ML
PH UR: 6 [PH] (ref 5–8)
PROT UR STRIP-MCNC: NEGATIVE MG/DL
RBC # UR STRIP: NEGATIVE /UL
SP GR UR: 1.02 (ref 1–1.03)
TSH SERPL DL<=0.05 MIU/L-ACNC: 6.2 UIU/ML (ref 0.27–4.2)
UROBILINOGEN UR QL: NORMAL

## 2025-06-06 PROCEDURE — 81003 URINALYSIS AUTO W/O SCOPE: CPT | Performed by: NURSE PRACTITIONER

## 2025-06-06 PROCEDURE — 84439 ASSAY OF FREE THYROXINE: CPT | Performed by: NURSE PRACTITIONER

## 2025-06-06 PROCEDURE — 84443 ASSAY THYROID STIM HORMONE: CPT | Performed by: NURSE PRACTITIONER

## 2025-06-06 PROCEDURE — 99213 OFFICE O/P EST LOW 20 MIN: CPT | Performed by: NURSE PRACTITIONER

## 2025-06-06 RX ORDER — TRIAMCINOLONE ACETONIDE 1 MG/G
OINTMENT TOPICAL
COMMUNITY
Start: 2025-05-13

## 2025-06-06 NOTE — PROGRESS NOTES
Chief Complaint  Abdominal Pain (Lower right pain, x3days/)    Subjective        Barak Cuellar presents to [facility]  History of Present Illness    History of Present Illness  The patient presents for evaluation of abdominal pain.    He reports a recurrence of abdominal pain, which is less severe than the previous day. The pain was initially localized to the right side but has since radiated to his back, causing discomfort. He describes the sensation as similar to a lower back strain. He experienced mild nausea yesterday, which has since resolved. He was able to eat dinner without any issues and even took a walk with his dog. He has not experienced any urinary or bowel irregularities, including gas passage. He also reports no hematuria, although he notes that his urine was slightly yellow this morning. He has not experienced any febrile episodes or rebound pain. He speculates that the pain may be a reaction to wintergreen-flavored gum, as he had a similar experience with wintergreen-flavored mints in the past. He has abstained from chewing the gum since Wednesday.     He engaged in physical exercise yesterday morning but did not lift heavy weights and did not experience any exacerbation of the pain during the workout. He is not currently taking any supplements, having discontinued creatine two months ago. He underwent laboratory tests this morning for thyroid function, as his TSH level was elevated six weeks ago.    He also reports tenderness in his right testicle last night, which has since resolved.    Results  Labs   - Urinalysis: No blood in urine   - Thyroid function test: One level high, other level in normal range     Results for orders placed or performed in visit on 06/06/25   POC Urinalysis Dipstick, Automated    Collection Time: 06/06/25  9:14 AM    Specimen: Urine   Result Value Ref Range    Color Yellow Yellow, Straw, Dark Yellow, Radha    Clarity, UA Clear Clear     "Specific Gravity  1.020 1.005 - 1.030    pH, Urine 6.0 5.0 - 8.0    Leukocytes Negative Negative    Nitrite, UA Negative Negative    Protein, POC Negative Negative mg/dL    Glucose, UA Negative Negative mg/dL    Ketones, UA Negative Negative    Urobilinogen, UA Normal Normal, 0.2 E.U./dL    Bilirubin Negative Negative    Blood, UA Negative Negative    Lot Number [id number]     Expiration Date 2025-06-29        Objective   Vital Signs:  /70 (BP Location: Right arm, Patient Position: Sitting, Cuff Size: Adult)   Pulse 60   Ht 177.8 cm (70\")   Wt 107 kg (236 lb 9.6 oz)   SpO2 98%   BMI 33.95 kg/m²   Estimated body mass index is 33.95 kg/m² as calculated from the following:    Height as of this encounter: 177.8 cm (70\").    Weight as of this encounter: 107 kg (236 lb 9.6 oz).          Physical Exam  Vitals reviewed.   Constitutional:       Appearance: Normal appearance.   HENT:      Nose: Nose normal.      Mouth/Throat:      Mouth: Mucous membranes are moist.      Pharynx: Oropharynx is clear.   Eyes:      Conjunctiva/sclera: Conjunctivae normal.   Cardiovascular:      Rate and Rhythm: Normal rate and regular rhythm.      Heart sounds: Normal heart sounds.   Pulmonary:      Effort: Pulmonary effort is normal.      Breath sounds: Normal breath sounds.   Abdominal:      General: Bowel sounds are normal. There is no distension.      Palpations: Abdomen is soft.      Tenderness: There is no abdominal tenderness. There is no guarding or rebound.   Musculoskeletal:         General: Normal range of motion.      Cervical back: Normal range of motion.   Skin:     General: Skin is warm.   Neurological:      Mental Status: He is alert and oriented to person, place, and time.   Psychiatric:         Mood and Affect: Mood normal.         Behavior: Behavior normal.         Thought Content: Thought content normal.        Result Review :                  Assessment and Plan   Diagnoses and all orders for this visit:    1. " Chronic RLQ pain (Primary)  -     POC Urinalysis Dipstick, Automated    2. Pain in right testicle  -     POC Urinalysis Dipstick, Automated        Assessment & Plan  1. Abdominal pain.  - Symptoms do not align with appendicitis; no fever, nausea, vomiting, or worsening pain.  - Possibility of kidney stone discussed; no definitive signs present. Urinalysis will be conducted to rule out blood in the urine.  - Likelihood of colon-related issues is low given normal bowel movements. Advised to monitor symptoms and report any recurrence of testicular pain or increase in abdominal pain.  - Ibuprofen recommended for pain management. If testicular pain recurs, an ultrasound will be ordered. If abdominal pain intensifies, a CT scan will be arranged.    2. Testicular pain.  - Reported tenderness in the right testicle last night, which has since resolved.  - Advised to monitor symptoms and report any recurrence of testicular pain.  - If testicular pain recurs, an ultrasound will be ordered.       The following portions of the patient's history were reviewed and updated as appropriate: allergies, current medications, past family history, past medical history, past social history, past surgical history, and problem list.       Follow Up   No follow-ups on file.  Patient was given instructions and counseling regarding his condition or for health maintenance advice. Please see specific information pulled into the AVS if appropriate.         Patient or patient representative verbalized consent for the use of Ambient Listening during the visit with  JAKE Chambers for chart documentation. 6/6/2025  10:08 EDT

## 2025-06-07 LAB — T4 FREE SERPL-MCNC: 1.32 NG/DL (ref 0.92–1.68)
